# Patient Record
Sex: MALE | Race: OTHER | URBAN - METROPOLITAN AREA
[De-identification: names, ages, dates, MRNs, and addresses within clinical notes are randomized per-mention and may not be internally consistent; named-entity substitution may affect disease eponyms.]

---

## 2019-01-02 ENCOUNTER — HOSPITAL ENCOUNTER (OUTPATIENT)
Dept: LAB | Age: 24
Discharge: HOME | End: 2019-01-02
Payer: MEDICAID

## 2019-01-02 ENCOUNTER — OFFICE VISIT (OUTPATIENT)
Dept: FAMILY MEDICINE CLINIC | Age: 24
End: 2019-01-02

## 2019-01-02 VITALS
HEART RATE: 73 BPM | HEIGHT: 71 IN | DIASTOLIC BLOOD PRESSURE: 82 MMHG | TEMPERATURE: 98.6 F | RESPIRATION RATE: 18 BRPM | SYSTOLIC BLOOD PRESSURE: 133 MMHG | OXYGEN SATURATION: 99 % | BODY MASS INDEX: 31.53 KG/M2 | WEIGHT: 225.2 LBS

## 2019-01-02 DIAGNOSIS — F98.8 ATTENTION DEFICIT DISORDER (ADD) WITHOUT HYPERACTIVITY: ICD-10-CM

## 2019-01-02 DIAGNOSIS — Z51.81 MEDICATION MONITORING ENCOUNTER: ICD-10-CM

## 2019-01-02 DIAGNOSIS — Z23 ENCOUNTER FOR IMMUNIZATION: ICD-10-CM

## 2019-01-02 DIAGNOSIS — J30.89 NON-SEASONAL ALLERGIC RHINITIS, UNSPECIFIED TRIGGER: Primary | ICD-10-CM

## 2019-01-02 PROCEDURE — 80307 DRUG TEST PRSMV CHEM ANLYZR: CPT

## 2019-01-02 RX ORDER — FLUTICASONE PROPIONATE 50 MCG
2 SPRAY, SUSPENSION (ML) NASAL DAILY
Qty: 1 BOTTLE | Refills: 5 | Status: SHIPPED | OUTPATIENT
Start: 2019-01-02

## 2019-01-02 NOTE — LETTER
Name:.Dontrae Jeans NA MR #:[id number] Provider Name:No primary care provider on file. *[id number]* BSMG-491 () Page 1 of 5 Initial [employer] CONTROLLED SUBSTANCE AGREEMENT I may be prescribed medications that are controlled substances as part  of my treatment plan for management of my medical condition(s). The goal of my treatment plan is to maintain and/or improve my health and wellbeing. Because controlled substances have an increased risk of abuse or harm, continual re-evaluation is needed determine if the goals of my treatment plan are being met for my safety and the safety of others. I, Viridiana Zuniga  am entering into this Controlled Substance Agreement with my provider, No primary care provider on file. at [place] [address] . I understand that successful treatment requires mutual trust and honesty between me and my provider. I understand that there are state and federal laws and regulations which apply to the medications that my provider may prescribe that must be followed. I understand there are risks and benefits ts of taking the medicines that my provider may prescribe. I understand and agree that following this Agreement is necessary in continuing my provider-patient relationship and success of my treatment plan. As a part of my treatment plan, I agree to the following: COMMUNICATION: 
 
1. I will communicate fully with my provider about my medical condition(s), including the effect on my daily life and how well my medications are helping. I will tell my provider all of the medications that I take for any reason, including medications I receive from another health care provider, and will notify my provider about all issues, problems or concerns, including any side effects, which may be related to my medications.  
 
I understand that this information allows my provider to adjust my treatment plan to help manage my medical condition. I understand that this information will become part of my permanent medical record. 2. I will notify my provider if I have a history of alcohol/drug misuse/addiction or if I have had treatment for alcohol/drug addiction in the past, or if I have a new problem with or concern about alcohol/drug use/addiction, because this increases the likelihood of high risk behaviors and may lead to serious medical conditions. 3. Females Only: I will notify my provider if I am or become pregnant, or if I intend to become pregnant, or if I intend to breastfeed. I understand that communication of these issues with my provider is important, due to possible effects my medication could have on an unborn fetus or breastfeeding child. Name:Julianne Thorpe AYB:[birth year] MR #:[id number] Provider Name:No primary care provider on file. *[id number]* BSMG-491 (5/16) Page 2 of 5 Initial [employer] MISUSE OF MEDICATIONS / DRUGS: 
 
1. I agree to take all controlled substances as prescribed, and will not misuse or abuse any controlled substances prescribed by my provider. For my safety, I will not increase the amount of medicine I take without first talking with and getting permission from my provider. 2. If I have a medical emergency, another health care provider may prescribe me medication. If I seek emergency treatment, I will notify my provider within seventy-two (72) hours. 3. I understand that my provider may discuss my use and/or possible misuse/abuse of controlled substances and alcohol, as appropriate, with any health care provider involved in my care, pharmacist or legal authority. ILLEGAL DRUGS: 
 
1. I will not use illegal drugs of any kind, including but not limited to marijuana, heroin, cocaine, or any prescription drug which is not prescribed to me.  
 
DRUG DIVERSION / PRESCRIPTION FRAUD: 
 
 1. I will not share, sell, trade, give away, or otherwise misuse my prescriptions or medications. 2. I will not alter any prescriptions provided to me by my provider. SINGLE PROVIDER: 
 
1. I agree that all controlled substances that I take will be prescribed only by my provider (or his/her covering provider) under this Agreement. This agreement does not prevent me from seeking emergency medical treatment or receiving pain management related to a surgery. PROTECTING MEDICATIONS: 
 
1. I am responsible for keeping my prescriptions and medications in a safe and secure place including safeguarding them from loss or theft. I understand that lost, stolen or damaged/destroyed prescriptions or medications will not be replaced. Name:. Paul Urbano CKT:[birth year] MR #:[id number] Provider Name:No primary care provider on file. *[id number]* BSMG-491 (5/16) Page 3 of 5 Initial [employer] PRESCRIPTION RENEWALS/REFILLS: 
 
 
1. I authorize my provider and my pharmacy to cooperate fully with any local, state, or federal law enforcement agency in the investigation of any possible misuse, sale, or other diversion of my controlled substance prescriptions or medications. RISKS: 
 
 
1. I understand that if I do not adhere to this Agreement in any way, my provider may change my prescriptions, stop prescribing controlled substances or end our provider-patient relationship. 2. If my provider decides to stop prescribing medication, or decides to end our provider-patient relationship,my provider may require that I taper my medications slowly. If necessary, my provider may also provide a prescription for other medications to treat my withdrawal symptoms. UNDERSTANDING THIS AGREEMENT: 
 
I understand that my provider may adjust or stop my prescriptions for controlled substances based on my medical condition and my treatment plan. I understand that this Agreement does not guarantee that I will be prescribed medications or controlled substances. I understand that controlled substances may be just one part of my treatment plan. My initial on each page and my signature below shows that I have read each page of this Agreement, I have had an opportunity to ask questions, and all of my questions have been answered to my satisfaction by my provider. By signing below, I agree to comply with this Agreement, and I understand that if I do not follow the Agreements listed above, my provider may stop 
 
 
 
_________________________________________  Date/Time 1/2/2019 11:30 AM   
             (Patient Signature)

## 2019-01-02 NOTE — PROGRESS NOTES
Chief Complaint Patient presents with [employer] Establish Care  Cough Patient stated that he been having a dry hacking cough x 2 months. Patient hemanth any pain.  Behavioral Problem  
  patient stated that he's been diagnosis with ADHD since age 1years old. HISTORY OF PRESENT ILLNESS Leonard Arenas is a 21 y.o. male. HPI Pt here to establish. Pt's mom notes that he has been coughing for about 2 months now. She describes the cough as dry and hacking. Pt is on disability due to adhd and developmental delays. As an adult, he was reclassified but mom is not sure what the new dx is. Mom would like for pt to get out of the house more. She is not aware of what options are available in terms of day programs. Mom would like for pt to resume his add medication. He was off of it for some years while living with his father. Mom notes that his focus is poor and he quickly forgets tasks that she has told him to complete. Review of Systems Constitutional: Negative. HENT: Negative. Respiratory: Positive for cough. Negative for sputum production. Psychiatric/Behavioral:  
     Poor focus All other systems reviewed and are negative. Physical Exam  
Constitutional: He is oriented to person, place, and time. He appears well-developed and well-nourished. No distress. HENT:  
Head: Atraumatic. Macrocephalic. Right Ear: Hearing, tympanic membrane, external ear and ear canal normal.  
Left Ear: Hearing, tympanic membrane, external ear and ear canal normal.  
Nose: Mucosal edema present. Mouth/Throat: Uvula is midline, oropharynx is clear and moist and mucous membranes are normal.  
Eyes: Conjunctivae and EOM are normal.  
Neck: Normal range of motion. Neck supple. No JVD present. No thyromegaly present. Cardiovascular: Normal rate, regular rhythm and normal heart sounds. Exam reveals no gallop and no friction rub. No murmur heard. Pulmonary/Chest: Effort normal and breath sounds normal. No respiratory distress. He has no wheezes. He has no rhonchi. He has no rales. Musculoskeletal: Normal range of motion. Lymphadenopathy:  
  He has no cervical adenopathy. Neurological: He is alert and oriented to person, place, and time. Coordination normal.  
Skin: Skin is warm and dry. Psychiatric: He has a normal mood and affect. His behavior is normal. Judgment and thought content normal.  
Nursing note and vitals reviewed. ASSESSMENT and PLAN Diagnoses and all orders for this visit: 1. Non-seasonal allergic rhinitis, unspecified trigger 
-     fluticasone (FLONASE) 50 mcg/actuation nasal spray; 2 Sprays by Both Nostrils route daily. 2. Attention deficit disorder (ADD) without hyperactivity 
-     lisdexamfetamine (VYVANSE) 20 mg capsule; Take 1 Cap (20 mg total) by mouth dailyEarliest Fill Date: 1/2/19. Max Daily Amount: 20 mg 
-     COMPLIANCE DRUG SCREEN/PRESCRIPTION MONITORING; Future Need for controlled substance agreement discussed. Letter printed; contract signed by pt.  profile reviewed. 3. Medication monitoring encounter 
-     COMPLIANCE DRUG SCREEN/PRESCRIPTION MONITORING; Future 4. Encounter for immunization 
-     INFLUENZA VIRUS VAC QUAD,SPLIT,PRESV FREE SYRINGE IM Follow-up Disposition: 
Return in about 4 weeks (around 1/30/2019) for add.

## 2019-01-02 NOTE — PROGRESS NOTES
Gilma Sneed presents today for Chief Complaint Patient presents with [employer] Prior Establish Care  Cough Patient stated that he been having a dry hacking cough x 2 months. Patient hemanth any pain.  Behavioral Problem  
  patient stated that he's been diagnosis with ADHD since age 1years old. Gilma Sneed preferred language for health care discussion is english/other. Is someone accompanying this pt? Yes mother Is the patient using any DME equipment during OV? no 
 
Depression Screening: PHQ over the last two weeks 1/2/2019 Little interest or pleasure in doing things Not at all Feeling down, depressed, irritable, or hopeless Not at all Total Score PHQ 2 0 Learning Assessment: 
Learning Assessment 1/2/2019 PRIMARY LEARNER Patient HIGHEST LEVEL OF EDUCATION - PRIMARY LEARNER  GRADUATED HIGH SCHOOL OR GED  
BARRIERS PRIMARY LEARNER [id number] [facility] CAREGIVER Yes CO-LEARNER NAME Mother CO-LEARNER HIGHEST LEVEL OF EDUCATION 2 YEARS OF COLLEGE  
BARRIERS CO-LEARNER NONE PRIMARY LANGUAGE ENGLISH  
PRIMARY LANGUAGE CO-LEARNER ENGLISH  
LEARNER PREFERENCE PRIMARY LISTENING  
LEARNER PREFERENCE CO-LEARNER LISTENING  
ANSWERED BY self RELATIONSHIP SELF Abuse Screening: 
Abuse Screening Questionnaire 1/2/2019 Do you ever feel afraid of your partner? Nelly Veliz Are you in a relationship with someone who physically or mentally threatens you? Nelly Veliz Is it safe for you to go home? Guerrero Wright Coordination of Care: 1. Have you been to the ER, urgent care clinic since your last visit? Hospitalized since your last visit? No 
 
2. Have you seen or consulted any other health care providers outside of the [address] [place] [address] since your last visit? Include any pap smears or colon screening. No 
 
Per-Dr. Lazaro weaver medication not taking to be deleted. Advance Directive: 1. Do you have an advance directive in place? Patient Reply:no 2. If not, would you like material regarding how to put one in place?  Patient Reply: no

## 2019-01-02 NOTE — PROGRESS NOTES
Luiz Brooks 1995 male who presents for routine immunizations. Patient denies any symptoms , reactions or allergies that would exclude them from being immunized today. Risks and adverse reactions were discussed and the VIS was given to them. All questions were addressed. Order placed for Flu,  per Verbal Order from Dr. Wiley Doctor with read back. Patient was observed for 15 min post injection. There were no reactions observed.  
 
Omar Taylor

## 2019-01-02 NOTE — PATIENT INSTRUCTIONS
Vaccine Information Statement Influenza (Flu) Vaccine (Inactivated or Recombinant): What you need to know Many Vaccine Information Statements are available in [place]iny[place] and other languages. See www.immunize.org/vis Hojas de Información Sobre Vacunas están disponibles en Español y en muchos otros idiomas. Visite www.immunize.org/vis 1. Why get vaccinated? Influenza (flu) is a contagious disease that spreads around the [place] every year, usually between October and May. Flu is caused by influenza viruses, and is spread mainly by coughing, sneezing, and close contact. Anyone can get flu. Flu strikes suddenly and can last several days. Symptoms vary by age, but can include: 
 fever/chills  sore throat  muscle aches  fatigue  cough  headache  runny or stuffy nose Flu can also lead to pneumonia and blood infections, and cause diarrhea and seizures in children. If you have a medical condition, such as heart or lung disease, flu can make it worse. Flu is more dangerous for some people. Infants and young children, people 72years of age and older, pregnant women, and people with certain health conditions or a weakened immune system are at greatest risk. Each year thousands of people in the [facility] die from flu, and many more are hospitalized. Flu vaccine can: 
 keep you from getting flu, 
 make flu less severe if you do get it, and 
 keep you from spreading flu to your family and other people. 2. Inactivated and recombinant flu vaccines A dose of flu vaccine is recommended every flu season. Children 6 months through 6years of age may need two doses during the same flu season. Everyone else needs only one dose each flu season.   
 
 
Some inactivated flu vaccines contain a very small amount of a mercury-based preservative called thimerosal. Studies have not shown thimerosal in vaccines to be harmful, but flu vaccines that do not contain thimerosal are available. There is no live flu virus in flu shots. They cannot cause the flu. There are many flu viruses, and they are always changing. Each year a new flu vaccine is made to protect against three or four viruses that are likely to cause disease in the upcoming flu season. But even when the vaccine doesnt exactly match these viruses, it may still provide some protection Flu vaccine cannot prevent: 
 flu that is caused by a virus not covered by the vaccine, or 
 illnesses that look like flu but are not. It takes about 2 weeks for protection to develop after vaccination, and protection lasts through the flu season. 3. Some people should not get this vaccine Tell the person who is giving you the vaccine:  If you have any severe, life-threatening allergies. If you ever had a life-threatening allergic reaction after a dose of flu vaccine, or have a severe allergy to any part of this vaccine, you may be advised not to get vaccinated. Most, but not all, types of flu vaccine contain a small amount of egg protein.  If you ever had Guillain-Barré Syndrome (also called GBS). Some people with a history of GBS should not get this vaccine. This should be discussed with your doctor.  If you are not feeling well. It is usually okay to get flu vaccine when you have a mild illness, but you might be asked to come back when you feel better. 4. Risks of a vaccine reaction With any medicine, including vaccines, there is a chance of reactions. These are usually mild and go away on their own, but serious reactions are also possible. Most people who get a flu shot do not have any problems with it. Minor problems following a flu shot include:  
 soreness, redness, or swelling where the shot was given  hoarseness  sore, red or itchy eyes  cough  fever  aches  headache  itching  fatigue If these problems occur, they usually begin soon after the shot and last 1 or 2 days. More serious problems following a flu shot can include the following:  There may be a small increased risk of Guillain-Barré Syndrome (GBS) after inactivated flu vaccine. This risk has been estimated at 1 or 2 additional cases per million people vaccinated. This is much lower than the risk of severe complications from flu, which can be prevented by flu vaccine.  Young children who get the flu shot along with pneumococcal vaccine (PCV13) and/or DTaP vaccine at the same time might be slightly more likely to have a seizure caused by fever. Ask your doctor for more information. Tell your doctor if a child who is getting flu vaccine has ever had a seizure. Problems that could happen after any injected vaccine:  People sometimes faint after a medical procedure, including vaccination. Sitting or lying down for about 15 minutes can help prevent fainting, and injuries caused by a fall. Tell your doctor if you feel dizzy, or have vision changes or ringing in the ears.  Some people get severe pain in the shoulder and have difficulty moving the arm where a shot was given. This happens very rarely.  Any medication can cause a severe allergic reaction. Such reactions from a vaccine are very rare, estimated at about 1 in a million doses, and would happen within a few minutes to a few hours after the vaccination. As with any medicine, there is a very remote chance of a vaccine causing a serious injury or death. The safety of vaccines is always being monitored. For more information, visit: www.cdc.gov/vaccinesafety/ 
 
5. What if there is a serious reaction? What should I look for?  Look for anything that concerns you, such as signs of a severe allergic reaction, very high fever, or unusual behavior.  
 
Signs of a severe allergic reaction can include hives, swelling of the face and throat, difficulty breathing, a fast heartbeat, dizziness, and weakness  usually within a few minutes to a few hours after the vaccination. What should I do?  If you think it is a severe allergic reaction or other emergency that cant wait, call 9-1-1 and get the person to the nearest hospital. Otherwise, call your doctor.  Reactions should be reported to the Vaccine Adverse Event Reporting System (VAERS). Your doctor should file this report, or you can do it yourself through  the VAERS web site at www.vaers. [facility].gov, or by calling 8-187.712.4147. VAERS does not give medical advice. 6. The National Vaccine Injury Compensation Program 
 
The [facility] Vaccine Injury Compensation Program (VICP) is a federal program that was created to compensate people who may have been injured by certain vaccines. Persons who believe they may have been injured by a vaccine can learn about the program and about filing a claim by calling 0-896.312.8941 or visiting the [address] [employer] website at www.[facility].gov/vaccinecompensation. There is a time limit to file a claim for compensation. 7. How can I learn more?  Ask your healthcare provider. He or she can give you the vaccine package insert or suggest other sources of information.  Call your local or state health department.  Contact the Centers for Disease Control and Prevention (CDC): 
- Call 9-843.929.3358 (1-800-CDC-INFO) or 
- Visit CDCs website at www.cdc.gov/flu Vaccine Information Statement Inactivated Influenza Vaccine 8/7/2015 
42 ELISA [place] [id number] Department of [facility] and [employer] Centers for Disease Control and Prevention Office Use Only

## 2019-01-09 LAB — DRUGS UR: NORMAL

## 2021-04-03 NOTE — ACP (ADVANCE CARE PLANNING)
Advance Directive:  1. Do you have an advance directive in place? Patient Reply:no    2. If not, would you like material regarding how to put one in place?  Patient Reply: no
complains of pain/discomfort